# Patient Record
Sex: MALE | Race: BLACK OR AFRICAN AMERICAN | Employment: UNEMPLOYED | URBAN - METROPOLITAN AREA
[De-identification: names, ages, dates, MRNs, and addresses within clinical notes are randomized per-mention and may not be internally consistent; named-entity substitution may affect disease eponyms.]

---

## 2021-12-21 ENCOUNTER — HOSPITAL ENCOUNTER (EMERGENCY)
Age: 46
Discharge: HOME | End: 2021-12-21
Attending: EMERGENCY MEDICINE
Payer: COMMERCIAL

## 2021-12-21 VITALS
SYSTOLIC BLOOD PRESSURE: 151 MMHG | RESPIRATION RATE: 16 BRPM | TEMPERATURE: 98.7 F | DIASTOLIC BLOOD PRESSURE: 64 MMHG | BODY MASS INDEX: 31.15 KG/M2 | HEIGHT: 69 IN | OXYGEN SATURATION: 94 % | WEIGHT: 210.32 LBS | HEART RATE: 99 BPM

## 2021-12-21 DIAGNOSIS — Z78.9 ALCOHOL USE: ICD-10-CM

## 2021-12-21 DIAGNOSIS — R03.0 ELEVATED BLOOD PRESSURE READING: Primary | ICD-10-CM

## 2021-12-21 DIAGNOSIS — F19.10 SUBSTANCE ABUSE (HCC): ICD-10-CM

## 2021-12-21 PROCEDURE — 99283 EMERGENCY DEPT VISIT LOW MDM: CPT

## 2021-12-21 PROCEDURE — 99282 EMERGENCY DEPT VISIT SF MDM: CPT

## 2021-12-21 NOTE — ED PROVIDER NOTES
EMERGENCY DEPARTMENT HISTORY AND PHYSICAL EXAM      Date: 12/21/2021  Patient Name: Kristian Shabazz    History of Presenting Illness     Chief Complaint   Patient presents with    Drug Problem       History Provided By: Patient    Additional History (Context): Kristian Shabazz is a 51 y.o. male with No significant past medical history who presents with drug problem. Arrives via EMS after bystander saw patient sleeping on the porch. He endorses drinking a bottle of tequila overnight. In addition he reports having cocaine but unsure of what time. States this may have contributed to his drowsiness. Patient states he only came to the emergency room because the EMS worker states his blood pressure was elevated. Denies history of hypertension. No reports of chest pain, shortness of breath, nausea, vomiting. Patient states he does not have an established PCP. Per EMS, no Narcan was given on scene. PCP: None        Past History     Past Medical History:  History reviewed. No pertinent past medical history. Past Surgical History:  History reviewed. No pertinent surgical history. Family History:  History reviewed. No pertinent family history. Social History:  Social History     Tobacco Use    Smoking status: Current Every Day Smoker    Smokeless tobacco: Never Used   Substance Use Topics    Alcohol use: Yes     Comment: occ    Drug use: Yes     Types: Cocaine, Heroin       Allergies:  No Known Allergies      Review of Systems   Review of Systems   Constitutional: Negative for chills and fever. HENT: Negative for congestion, rhinorrhea and sore throat. Respiratory: Negative for cough and shortness of breath. Cardiovascular: Negative for chest pain and leg swelling. Gastrointestinal: Negative for abdominal pain, constipation, diarrhea, nausea and vomiting. Genitourinary: Negative for dysuria, frequency and urgency. Musculoskeletal: Negative for back pain, gait problem and neck pain.    Skin: Negative for wound. Neurological: Negative for dizziness, syncope, weakness, numbness and headaches. All other systems reviewed and are negative. Physical Exam     Vitals:    12/21/21 1521 12/21/21 1642   BP: (!) 140/96 (!) 151/64   Pulse: 99    Resp: 16    Temp: 98.7 °F (37.1 °C)    SpO2: 94%    Weight: 95.4 kg (210 lb 5.1 oz)    Height: 5' 9\" (1.753 m)      Physical Exam  Vitals and nursing note reviewed. Constitutional:       General: He is not in acute distress. Appearance: He is well-developed. He is not ill-appearing. HENT:      Head: Normocephalic and atraumatic. Right Ear: Tympanic membrane, ear canal and external ear normal.      Left Ear: Tympanic membrane, ear canal and external ear normal.      Nose: Nose normal.      Mouth/Throat:      Mouth: Mucous membranes are moist.      Pharynx: Oropharynx is clear. No oropharyngeal exudate or posterior oropharyngeal erythema. Eyes:      Extraocular Movements: Extraocular movements intact. Conjunctiva/sclera: Conjunctivae normal.      Pupils: Pupils are equal, round, and reactive to light. Cardiovascular:      Rate and Rhythm: Normal rate and regular rhythm. Pulses: Normal pulses. Heart sounds: Normal heart sounds. Pulmonary:      Effort: Pulmonary effort is normal.      Breath sounds: Normal breath sounds. Abdominal:      General: Bowel sounds are normal. There is no distension. Palpations: Abdomen is soft. Tenderness: There is no abdominal tenderness. There is no guarding or rebound. Musculoskeletal:         General: Normal range of motion. Cervical back: Normal range of motion and neck supple. Lymphadenopathy:      Cervical: No cervical adenopathy. Skin:     General: Skin is warm and dry. Neurological:      Mental Status: He is alert and oriented to person, place, and time. GCS: GCS eye subscore is 4. GCS verbal subscore is 5. GCS motor subscore is 6.       Cranial Nerves: Cranial nerves are intact. No cranial nerve deficit. Sensory: Sensation is intact. Motor: Motor function is intact. Coordination: Coordination is intact. Psychiatric:         Thought Content: Thought content normal.           Diagnostic Study Results     Labs -   No results found for this or any previous visit (from the past 12 hour(s)). Radiologic Studies -   No orders to display     CT Results  (Last 48 hours)    None        CXR Results  (Last 48 hours)    None            Medical Decision Making   I am the first provider for this patient. I reviewed the vital signs, available nursing notes, past medical history, past surgical history, family history and social history. Vital Signs-Reviewed the patient's vital signs. Records Reviewed: Nursing Notes, Old Medical Records, Previous Radiology Studies and Previous Laboratory Studies      57-year-old male presenting for drug problem exhibiting benign physical exam.  BP mildly elevated 140-150s over 60s. Discuss nonpharmacological interventions like diet, exercise. Advised to also refrain from substance abuse which has likely the cause of his elevated blood pressure today. Patient provided PCP resources for follow-up and verbalized understanding. HYPERTENSION COUNSELING  For 10 minutes, education was provided to the patient today regarding their hypertension. Patient is made aware of their elevated blood pressure and is instructed to follow up with their PCP. Patient is counseled regarding consequences of chronic, uncontrolled hypertension including kidney disease, heart disease, stroke or even death. Patient states their understanding and agrees to follow up this week. I reviewed heart healthy diet in addition to exercise for blood pressure management. The patient verbalized understanding  ED Course:          Disposition:  Discharge   DISCHARGE NOTE:   Pt has been reexamined. Patient has no new complaints, changes, or physical findings.   Care plan outlined and precautions discussed. All of pt's questions and concerns were addressed. Patient was instructed and agrees to follow up with PCP, as well as to return to the ED upon further deterioration. Patient is ready to go home. Follow-up Information     Follow up With Specialties Details Why David Edwards, [id number] [place] [id number]  [phone]    [facility]    [address] [facility] [address]          There are no discharge medications for this patient. Provider Notes (Medical Decision Making):     Procedures:  Procedures      Diagnosis     Clinical Impression:   1. Elevated blood pressure reading    2. Substance abuse ([facility] [address]    3.  Alcohol use

## 2021-12-21 NOTE — ED NOTES
Discharge instructions were given to the patient by Lizbet Whittington. The patient left the Emergency Department ambulatory, alert and oriented and in no acute distress with no prescriptions. The patient was encouraged to call or return to the ED for worsening issues or problems and was encouraged to schedule a follow up appointment for continuing care. The patient verbalized understanding of discharge instructions and prescriptions, all questions were answered. The patient has no further concerns at this time.

## 2021-12-21 NOTE — ED NOTES
Pt presents to ED stating someone called 911 because he was sleeping on his porch, he states he came to ER because EMS told him his blood pressure was really high. pt does admit to cocaine and heroin use this am denies cp/sob at this time. Pt is alert and oriented x 4, RR even and unlabored, skin is warm and dry. Assessment completed and pt updated on plan of care. Call bell in reach. Emergency Department Nursing Plan of Care       The Nursing Plan of Care is developed from the Nursing assessment and Emergency Department Attending provider initial evaluation. The plan of care may be reviewed in the ED Provider note.     The Plan of Care was developed with the following considerations:   Patient / Family readiness to learn indicated by:verbalized understanding  Persons(s) to be included in education: patient  Barriers to Learning/Limitations:No    Signed     María Elena Restrepo RN    12/21/2021   4:46 PM

## 2021-12-21 NOTE — ED TRIAGE NOTES
Pt reports doing cocaine, heroin, and drinking alcohol today. Pt reports EMS found him passed out on a porch. Pt reports he is here because they told him his BP was high.

## 2021-12-21 NOTE — DISCHARGE INSTRUCTIONS
It was a pleasure taking care of you at [facility] Emergency Department today. We know that when you come to [facility], you are entrusting us with your health, comfort, and safety. Our physicians and nurses honor that trust, and we truly appreciate the opportunity to care for you and your loved ones. We also value our feedback. If you receive a survey about your Emergency Department experience today, please fill it out. We care about our patients' feedback, and we listen to what you have to say. Thank you!

## 2023-01-29 ENCOUNTER — APPOINTMENT (OUTPATIENT)
Dept: CT IMAGING | Age: 48
End: 2023-01-29
Attending: PHYSICIAN ASSISTANT
Payer: COMMERCIAL

## 2023-01-29 ENCOUNTER — APPOINTMENT (OUTPATIENT)
Dept: GENERAL RADIOLOGY | Age: 48
End: 2023-01-29
Attending: PHYSICIAN ASSISTANT
Payer: COMMERCIAL

## 2023-01-29 ENCOUNTER — HOSPITAL ENCOUNTER (EMERGENCY)
Age: 48
Discharge: HOME | End: 2023-01-29
Attending: EMERGENCY MEDICINE
Payer: COMMERCIAL

## 2023-01-29 VITALS
BODY MASS INDEX: 27.55 KG/M2 | DIASTOLIC BLOOD PRESSURE: 83 MMHG | OXYGEN SATURATION: 94 % | HEIGHT: 69 IN | WEIGHT: 186 LBS | SYSTOLIC BLOOD PRESSURE: 117 MMHG | HEART RATE: 64 BPM | TEMPERATURE: 98.2 F | RESPIRATION RATE: 16 BRPM

## 2023-01-29 DIAGNOSIS — F19.10 SUBSTANCE ABUSE (HCC): ICD-10-CM

## 2023-01-29 DIAGNOSIS — K02.9 DENTAL CARIES: ICD-10-CM

## 2023-01-29 DIAGNOSIS — Z72.0 TOBACCO ABUSE: ICD-10-CM

## 2023-01-29 DIAGNOSIS — R55 VASOVAGAL SYNCOPE: Primary | ICD-10-CM

## 2023-01-29 LAB
ALBUMIN SERPL-MCNC: 3.1 G/DL (ref 3.5–5)
ALBUMIN/GLOB SERPL: 1.1 (ref 1.1–2.2)
ALP SERPL-CCNC: 59 U/L (ref 45–117)
ALT SERPL-CCNC: 17 U/L (ref 12–78)
AMPHET UR QL SCN: NEGATIVE
ANION GAP SERPL CALC-SCNC: 6 MMOL/L (ref 5–15)
AST SERPL-CCNC: 36 U/L (ref 15–37)
BARBITURATES UR QL SCN: NEGATIVE
BASOPHILS # BLD: 0 K/UL (ref 0–0.1)
BASOPHILS NFR BLD: 1 % (ref 0–1)
BENZODIAZ UR QL: NEGATIVE
BILIRUB SERPL-MCNC: 0.4 MG/DL (ref 0.2–1)
BUN SERPL-MCNC: 12 MG/DL (ref 6–20)
BUN/CREAT SERPL: 13 (ref 12–20)
CALCIUM SERPL-MCNC: 7.9 MG/DL (ref 8.5–10.1)
CANNABINOIDS UR QL SCN: NEGATIVE
CHLORIDE SERPL-SCNC: 106 MMOL/L (ref 97–108)
CO2 SERPL-SCNC: 29 MMOL/L (ref 21–32)
COCAINE UR QL SCN: POSITIVE
CREAT SERPL-MCNC: 0.95 MG/DL (ref 0.7–1.3)
DIFFERENTIAL METHOD BLD: ABNORMAL
DRUG SCRN COMMENT,DRGCM: ABNORMAL
EOSINOPHIL # BLD: 0.1 K/UL (ref 0–0.4)
EOSINOPHIL NFR BLD: 2 % (ref 0–7)
ERYTHROCYTE [DISTWIDTH] IN BLOOD BY AUTOMATED COUNT: 12.7 % (ref 11.5–14.5)
ETHANOL SERPL-MCNC: <10 MG/DL
GLOBULIN SER CALC-MCNC: 2.7 G/DL (ref 2–4)
GLUCOSE SERPL-MCNC: 173 MG/DL (ref 65–100)
HCT VFR BLD AUTO: 38.1 % (ref 36.6–50.3)
HGB BLD-MCNC: 13.4 G/DL (ref 12.1–17)
IMM GRANULOCYTES # BLD AUTO: 0 K/UL (ref 0–0.04)
IMM GRANULOCYTES NFR BLD AUTO: 0 % (ref 0–0.5)
LYMPHOCYTES # BLD: 1.4 K/UL (ref 0.8–3.5)
LYMPHOCYTES NFR BLD: 28 % (ref 12–49)
MCH RBC QN AUTO: 33.3 PG (ref 26–34)
MCHC RBC AUTO-ENTMCNC: 35.2 G/DL (ref 30–36.5)
MCV RBC AUTO: 94.8 FL (ref 80–99)
METHADONE UR QL: NEGATIVE
MONOCYTES # BLD: 0.4 K/UL (ref 0–1)
MONOCYTES NFR BLD: 8 % (ref 5–13)
NEUTS SEG # BLD: 3 K/UL (ref 1.8–8)
NEUTS SEG NFR BLD: 61 % (ref 32–75)
NRBC # BLD: 0 K/UL (ref 0–0.01)
NRBC BLD-RTO: 0 PER 100 WBC
OPIATES UR QL: NEGATIVE
PCP UR QL: NEGATIVE
PLATELET # BLD AUTO: 175 K/UL (ref 150–400)
PMV BLD AUTO: 10.1 FL (ref 8.9–12.9)
POTASSIUM SERPL-SCNC: 3.6 MMOL/L (ref 3.5–5.1)
PROT SERPL-MCNC: 5.8 G/DL (ref 6.4–8.2)
RBC # BLD AUTO: 4.02 M/UL (ref 4.1–5.7)
SODIUM SERPL-SCNC: 141 MMOL/L (ref 136–145)
TROPONIN-HIGH SENSITIVITY: 13 NG/L (ref 0–76)
TROPONIN-HIGH SENSITIVITY: 16 NG/L (ref 0–76)
WBC # BLD AUTO: 4.9 K/UL (ref 4.1–11.1)

## 2023-01-29 PROCEDURE — 36415 COLL VENOUS BLD VENIPUNCTURE: CPT

## 2023-01-29 PROCEDURE — 71045 X-RAY EXAM CHEST 1 VIEW: CPT

## 2023-01-29 PROCEDURE — 85025 COMPLETE CBC W/AUTO DIFF WBC: CPT

## 2023-01-29 PROCEDURE — 84484 ASSAY OF TROPONIN QUANT: CPT

## 2023-01-29 PROCEDURE — 70450 CT HEAD/BRAIN W/O DYE: CPT

## 2023-01-29 PROCEDURE — 99285 EMERGENCY DEPT VISIT HI MDM: CPT

## 2023-01-29 PROCEDURE — 80053 COMPREHEN METABOLIC PANEL: CPT

## 2023-01-29 PROCEDURE — 82077 ASSAY SPEC XCP UR&BREATH IA: CPT

## 2023-01-29 PROCEDURE — 93005 ELECTROCARDIOGRAM TRACING: CPT

## 2023-01-29 PROCEDURE — 80307 DRUG TEST PRSMV CHEM ANLYZR: CPT

## 2023-01-29 NOTE — ED NOTES
Pt comes with complaint of dizziness. Pt states he was watching basketball states stressed out from game and significant other. Blacked out with no LOC. Pt was staring off into space and laughing inappropriately. Pt also reports drug use, pt SO reports drug use last night \"thinks something was in it that shouldn't have been in it. His behavior seems off\". Pt denies fevers, dizziness at this time. Pt A & Ox4.

## 2023-01-29 NOTE — ED PROVIDER NOTES
[facility] EMERGENCY DEPT  EMERGENCY DEPARTMENT ENCOUNTER       Pt Name: Eliot Garcia  MRN: [id number]  [place] 1975  Date of evaluation: 1/29/2023  Provider: Taye Mckeon PA-C   PCP: None  Note Started: 11:26 AM 1/29/23     CHIEF COMPLAINT       Chief Complaint   Patient presents with    Dizziness     Dizziness since last night blacked out no LOC        HISTORY OF PRESENT ILLNESS: 1 or more elements      History From: Patient  HPI Limitations : None     Eliot Garcia is a 49 y.o. male with medical history significant for tobacco and substance abuse who presents via private vehicle with complaints of acute moderate episode of syncopal episode yesterday. Patient states that he was sitting and watching football when he stood up to get some pain medicine for his tooth pain. States that he \"blacked out\" for an unknown period of time. He states he must of been only a couple of minutes or seconds as there was another female individual there that said it was not long. Patient also endorses that he did take Tylenol 3 and was using cocaine prior to the episode. He denied any precipitating factors including chest pain, shortness of breath, palpitations. No recent fever, chills, nausea, vomiting, cough, hemoptysis, seizure activity, numbness, tingling, focal weakness, redness, rash, wound, neck pain, abdominal pain, diarrhea, URI symptoms. He denies any IV drug abuse. No history of diabetes. Patient's female partner who is present in the room also endorses concern that the cocaine he was using might of had other medications or drugs mixed with it and he is concerned that this may have caused his symptoms. No modifying factors prior to arrival.  Patient is very poor historian and unclear on the timeline of events from yesterday evening. He does deny any alcohol use. He is unclear whether he hit his head or not. No blood thinners.      Nursing Notes were all reviewed and agreed with or any disagreements were addressed in the HPI. REVIEW OF SYSTEMS      Review of Systems   Constitutional:  Negative for activity change, appetite change, chills, diaphoresis, fatigue and fever. HENT:  Positive for dental problem. Negative for congestion, drooling, ear discharge, ear pain, facial swelling, hearing loss, mouth sores, nosebleeds, postnasal drip, rhinorrhea, sinus pressure, sinus pain, sneezing, sore throat, tinnitus, trouble swallowing and voice change. Eyes:  Negative for photophobia, pain, redness and visual disturbance. Respiratory:  Negative for cough, chest tightness, shortness of breath and wheezing. Cardiovascular:  Negative for chest pain and palpitations. Gastrointestinal:  Negative for abdominal pain, constipation, diarrhea, nausea and vomiting. Genitourinary: Negative. Negative for dysuria and frequency. Musculoskeletal: Negative. Negative for arthralgias, back pain, gait problem, joint swelling, myalgias, neck pain and neck stiffness. Skin: Negative. Negative for pallor and rash. Neurological:  Positive for dizziness, syncope and light-headedness. Negative for tremors, seizures, facial asymmetry, speech difficulty, weakness, numbness and headaches. Psychiatric/Behavioral:  Negative for confusion. The patient is nervous/anxious. Positives and Pertinent negatives as per HPI. PAST HISTORY     Past Medical History:  History reviewed. No pertinent past medical history. Past Surgical History:  History reviewed. No pertinent surgical history. Family History:  History reviewed. No pertinent family history.     Social History:  Social History     Tobacco Use    Smoking status: Every Day    Smokeless tobacco: Never   Substance Use Topics    Alcohol use: Yes     Comment: occ    Drug use: Yes     Types: Cocaine, Heroin       Allergies:  No Known Allergies    CURRENT MEDICATIONS      Previous Medications    No medications on file       SCREENINGS               No data recorded PHYSICAL EXAM      ED Triage Vitals [01/29/23 1124]   ED Encounter Vitals Group      BP (!) 136/103      Pulse (Heart Rate) 89      Resp Rate 17      Temp 98.2 °F (36.8 °C)      Temp src       O2 Sat (%) 92 %      Weight 186 lb      Height 5' 9\"        Physical Exam  Vitals and nursing note reviewed. Constitutional:       General: He is not in acute distress. Appearance: Normal appearance. He is well-developed. He is not ill-appearing, toxic-appearing or diaphoretic. HENT:      Head: Normocephalic and atraumatic. Jaw: There is normal jaw occlusion. No trismus, tenderness, swelling or malocclusion. Right Ear: Hearing, tympanic membrane, ear canal and external ear normal.      Left Ear: Hearing, tympanic membrane, ear canal and external ear normal.      Nose: Nose normal.      Right Sinus: No maxillary sinus tenderness or frontal sinus tenderness. Left Sinus: No maxillary sinus tenderness or frontal sinus tenderness. Mouth/Throat:      Mouth: Mucous membranes are moist. No angioedema. Dentition: Does not have dentures. Dental caries present. No dental tenderness, gingival swelling or dental abscesses. Tongue: No lesions. Tongue does not deviate from midline. Palate: No mass and lesions. Pharynx: Oropharynx is clear. Uvula midline. No pharyngeal swelling, oropharyngeal exudate, posterior oropharyngeal erythema or uvula swelling. Tonsils: No tonsillar exudate or tonsillar abscesses. Eyes:      Extraocular Movements: Extraocular movements intact. Conjunctiva/sclera: Conjunctivae normal.   Neck:      Trachea: Trachea and phonation normal.   Cardiovascular:      Rate and Rhythm: Normal rate and regular rhythm. Pulses: Normal pulses. Radial pulses are 2+ on the right side and 2+ on the left side. Posterior tibial pulses are 2+ on the right side and 2+ on the left side.       Heart sounds: Normal heart sounds, S1 normal and S2 normal. Pulmonary:      Effort: Pulmonary effort is normal. No tachypnea, accessory muscle usage or respiratory distress. Breath sounds: Normal breath sounds and air entry. No stridor. No decreased breath sounds, wheezing, rhonchi or rales. Abdominal:      General: Abdomen is flat. Palpations: Abdomen is soft. Tenderness: There is no abdominal tenderness. There is no guarding. Musculoskeletal:         General: Normal range of motion. Cervical back: Full passive range of motion without pain and normal range of motion. Right lower leg: No edema. Left lower leg: No edema. Lymphadenopathy:      Cervical: No cervical adenopathy. Skin:     General: Skin is warm and dry. Capillary Refill: Capillary refill takes less than 2 seconds. Coloration: Skin is not pale. Neurological:      General: No focal deficit present. Mental Status: He is alert and oriented to person, place, and time. Cranial Nerves: Cranial nerves 2-12 are intact. Sensory: Sensation is intact. Motor: Motor function is intact. Coordination: Coordination is intact. Gait: Gait is intact. Psychiatric:         Speech: Speech normal.         Behavior: Behavior normal.         Thought Content:  Thought content normal.         Judgment: Judgment normal.       Pulse Oximetry Analysis - Normal 97% on RA       DIAGNOSTIC RESULTS   LABS:     Recent Results (from the past 12 hour(s))   TROPONIN-HIGH SENSITIVITY    Collection Time: 01/29/23 11:30 AM   Result Value Ref Range    Troponin-High Sensitivity 13 0 - 76 ng/L   CBC WITH AUTOMATED DIFF    Collection Time: 01/29/23 11:30 AM   Result Value Ref Range    WBC 4.9 4.1 - 11.1 K/uL    RBC 4.02 (L) 4.10 - 5.70 M/uL    HGB 13.4 12.1 - 17.0 g/dL    HCT 38.1 36.6 - 50.3 %    MCV 94.8 80.0 - 99.0 FL    MCH 33.3 26.0 - 34.0 PG    MCHC 35.2 30.0 - 36.5 g/dL    RDW 12.7 11.5 - 14.5 %    PLATELET 237 594 - 788 K/uL    MPV 10.1 8.9 - 12.9 FL    NRBC 0.0 0 PER 100 WBC    ABSOLUTE NRBC 0.00 0.00 - 0.01 K/uL    NEUTROPHILS 61 32 - 75 %    LYMPHOCYTES 28 12 - 49 %    MONOCYTES 8 5 - 13 %    EOSINOPHILS 2 0 - 7 %    BASOPHILS 1 0 - 1 %    IMMATURE GRANULOCYTES 0 0.0 - 0.5 %    ABS. NEUTROPHILS 3.0 1.8 - 8.0 K/UL    ABS. LYMPHOCYTES 1.4 0.8 - 3.5 K/UL    ABS. MONOCYTES 0.4 0.0 - 1.0 K/UL    ABS. EOSINOPHILS 0.1 0.0 - 0.4 K/UL    ABS. BASOPHILS 0.0 0.0 - 0.1 K/UL    ABS. IMM. GRANS. 0.0 0.00 - 0.04 K/UL    DF AUTOMATED     METABOLIC PANEL, COMPREHENSIVE    Collection Time: 01/29/23 11:30 AM   Result Value Ref Range    Sodium 141 136 - 145 mmol/L    Potassium 3.6 3.5 - 5.1 mmol/L    Chloride 106 97 - 108 mmol/L    CO2 29 21 - 32 mmol/L    Anion gap 6 5 - 15 mmol/L    Glucose 173 (H) 65 - 100 mg/dL    BUN 12 6 - 20 MG/DL    Creatinine 0.95 0.70 - 1.30 MG/DL    BUN/Creatinine ratio 13 12 - 20      eGFR >60 >60 ml/min/1.73m2    Calcium 7.9 (L) 8.5 - 10.1 MG/DL    Bilirubin, total 0.4 0.2 - 1.0 MG/DL    ALT (SGPT) 17 12 - 78 U/L    AST (SGOT) 36 15 - 37 U/L    Alk. phosphatase 59 45 - 117 U/L    Protein, total 5.8 (L) 6.4 - 8.2 g/dL    Albumin 3.1 (L) 3.5 - 5.0 g/dL    Globulin 2.7 2.0 - 4.0 g/dL    A-G Ratio 1.1 1.1 - 2.2     ETHYL ALCOHOL    Collection Time: 01/29/23 11:30 AM   Result Value Ref Range    ALCOHOL(ETHYL),SERUM <10 <10 MG/DL   DRUG SCREEN, URINE    Collection Time: 01/29/23 12:14 PM   Result Value Ref Range    AMPHETAMINES Negative NEG      BARBITURATES Negative NEG      BENZODIAZEPINES Negative NEG      COCAINE Positive (A) NEG      METHADONE Negative NEG      OPIATES Negative NEG      PCP(PHENCYCLIDINE) Negative NEG      THC (TH-CANNABINOL) Negative NEG      Drug screen comment (NOTE)    TROPONIN-HIGH SENSITIVITY    Collection Time: 01/29/23 12:55 PM   Result Value Ref Range    Troponin-High Sensitivity 16 0 - 76 ng/L        EKG:  When ordered, EKG's are interpreted by the Emergency Department Physician in the absence of a cardiologist.  Please see their note for interpretation of EKG. RADIOLOGY:  Non-plain film images such as CT, Ultrasound and MRI are read by the radiologist. Plain radiographic images are visualized and preliminarily interpreted by the ED Provider with the below findings:       Interpretation per the Radiologist below, if available at the time of this note:     CT HEAD WO CONT    Result Date: 1/29/2023  EXAM: CT HEAD WO CONT INDICATION: syncope; head injury? COMPARISON: CT February 2009. CONTRAST: None. TECHNIQUE: Unenhanced CT of the head was performed using 5 mm images. Brain and bone windows were generated. Coronal and sagittal reformats. CT dose reduction was achieved through use of a standardized protocol tailored for this examination and automatic exposure control for dose modulation. FINDINGS: The ventricles and sulci are normal in size, shape and configuration. There is no significant white matter disease. There is no intracranial hemorrhage, extra-axial collection, or mass effect. The basilar cisterns are open. No CT evidence of acute infarct. The bone windows demonstrate surgical hardware in the lateral left orbital wall. The visualized portions of the paranasal sinuses and mastoid air cells are clear. No acute intracranial abnormality. XR CHEST PORT    Result Date: 1/29/2023  EXAM:  XR CHEST PORT INDICATION: Syncope COMPARISON: none TECHNIQUE: portable chest AP view FINDINGS: The cardiac silhouette is within normal limits. The pulmonary vasculature is within normal limits. The lungs and pleural spaces are clear. The visualized bones and upper abdomen are age-appropriate. No acute process on portable chest.        PROCEDURES   Unless otherwise noted below, none  Procedures     CRITICAL CARE TIME   none    EMERGENCY DEPARTMENT COURSE and DIFFERENTIAL DIAGNOSIS/MDM   Initial assessment performed. The patients presenting problems have been discussed, and they are in agreement with the care plan formulated and outlined with them.   I have encouraged them to ask questions as they arise throughout their visit. Vitals:    Vitals:    01/29/23 1124 01/29/23 1126 01/29/23 1130 01/29/23 1206   BP: (!) 136/103  123/82 117/83   Pulse: 89   64   Resp: 17      Temp: 98.2 °F (36.8 °C)      SpO2: 92% 97% 96% 94%   Weight: 84.4 kg (186 lb)      Height: 5' 9\" (1.753 m)           Patient was given the following medications:  Medications - No data to display    CONSULTS: (Who and What was discussed)  None      Chronic Conditions: substance and tobacco abuse    Social Determinants affecting Dx or Tx: None    Records Reviewed (source and summary): Prior medical records, Previous Radiology studies, Previous Laboratory studies, Previous EKGs, and Nursing notes    CC/HPI Summary, DDx, ED Course, and Reassessment: Well-appearing but anxious 43-year-old male presents secondary to syncopal episode yesterday. States that he was standing up to get some pain medications for his tooth pain when he also did \"blacked out\" and lost consciousness for unknown period of time. No known head injury, the patient is a very poor historian and is unclear of the timeline. He also endorses using cocaine and is unclear whether cocaine and other drugs mixed with it. denies IV drug abuse. Patient has relatively unremarkable exam stable heart and lung sounds and intact distal pulses. Ddx: vasovagal/situational syncope, cardiogenic syncope, orthostatic hypotension, dehydration. Will get labs, EKG, orthostatics and fluids PRN. We will also obtain CT head given patient's poor historian unclear head injury or trauma.     Per the [facility] Syncope Rule, the patient does not have the following criteria:  1) Signs and symptoms of ACS  2) Signs of conduction disease  3) Worrisome cardiac history  4) Valvular heart disease by history or physical  examination  5) Family history of sudden death  6) Persistent abnormal vital signs in the ED  7) Volume depletion such as persistent dehydration, gastrointestinal bleeding, or hematocrit < 30  8) Primary CNS (central nervous system) event    The patient does not have any of the following risk factors for the [facility] Syncope Rule (SFSR):    C - History of congestive heart failure   H - Hematocrit < 30%   E - Abnormal ECG   S - Shortness of breath   S - Triage systolic blood pressure < 90      ED Course as of 01/29/23 1342   Sun Jan 29, 2023   1251 Pt resting comfortably in room in NAD. No new symptoms or complaints at this time. Available labs/ results reviewed with pt. Plan to trend troponin prior to discharge. Pt currently asymptomatic. [SM]      ED Course User Index  [SM] Hubbard Homans, PA-C     TOBACCO COUNSELING:  Upon evaluation, pt expressed that they are a current tobacco user. Pt has been counseled on the dangers of smoking and was encouraged to quit as soon as possible in order to decrease further risks to their health. Pt has conveyed their understanding of the risks involved should they continue to use tobacco products. 4-minute discussion    Disposition Considerations (Tests not done, Shared Decision Making, Pt Expectation of Test or Tx.):      Progress Note:   Updated pt on all returned results and findings. Discussed the importance of proper follow up as referred below along with return precautions. Pt in agreement with the care plan and expresses agreement with and understanding of all items discussed. FINAL IMPRESSION     1. Vasovagal syncope    2. Substance abuse ([address]    3. Tobacco abuse    4. Dental caries          DISPOSITION/PLAN   Eleonora East's  results have been reviewed with him. He has been counseled regarding his diagnosis, treatment, and plan. He verbally conveys understanding and agreement of the signs, symptoms, diagnosis, treatment and prognosis and additionally agrees to follow up as discussed. He also agrees with the care-plan and conveys that all of his questions have been answered.   I have also provided discharge instructions for him that include: educational information regarding their diagnosis and treatment, and list of reasons why they would want to return to the ED prior to their follow-up appointment, should his condition change. Discharged    1:42 PM  I have discussed with patient their diagnosis, treatment, and follow up plan. The patient agrees to follow up as outlined in discharge paperwork and also to return to the ED with any worsening. Kezia Contreras PA-C         PATIENT REFERRED TO:  Follow-up Information       Follow up With Specialties Details Why [address] [facility] EMERGENCY DEPT Emergency Medicine Go to  As needed, If symptoms worsen [address] [facility]  Schedule an appointment as soon as possible for a visit in 1 day As needed [id number] [facility] [address]  There are no discharge medications for this patient. DISCONTINUED MEDICATIONS:  There are no discharge medications for this patient. Shared Not Shared DARRIAN: I have seen and evaluated the patient. My supervision physician was available for consultation. I am the Primary Clinician of Record. Kezia Contreras PA-C (electronically signed)    (Please note that parts of this dictation were completed with voice recognition software. Quite often unanticipated grammatical, syntax, homophones, and other interpretive errors are inadvertently transcribed by the computer software. Please disregards these errors.  Please excuse any errors that have escaped final proofreading.)

## 2023-01-30 LAB
ATRIAL RATE: 67 BPM
CALCULATED P AXIS, ECG09: 72 DEGREES
CALCULATED R AXIS, ECG10: -16 DEGREES
CALCULATED T AXIS, ECG11: 41 DEGREES
DIAGNOSIS, 93000: NORMAL
P-R INTERVAL, ECG05: 164 MS
Q-T INTERVAL, ECG07: 386 MS
QRS DURATION, ECG06: 114 MS
QTC CALCULATION (BEZET), ECG08: 407 MS
VENTRICULAR RATE, ECG03: 67 BPM

## 2024-04-28 ENCOUNTER — HOSPITAL ENCOUNTER (EMERGENCY)
Facility: HOSPITAL | Age: 49
Discharge: HOME | End: 2024-04-28
Attending: EMERGENCY MEDICINE
Payer: MEDICAID

## 2024-04-28 VITALS
HEIGHT: 69 IN | BODY MASS INDEX: 26.66 KG/M2 | WEIGHT: 180 LBS | SYSTOLIC BLOOD PRESSURE: 134 MMHG | RESPIRATION RATE: 17 BRPM | TEMPERATURE: 97.8 F | DIASTOLIC BLOOD PRESSURE: 92 MMHG | OXYGEN SATURATION: 97 % | HEART RATE: 89 BPM

## 2024-04-28 DIAGNOSIS — M54.12 CERVICAL RADICULOPATHY: Primary | ICD-10-CM

## 2024-04-28 DIAGNOSIS — G56.22 ULNAR NEUROPATHY OF LEFT UPPER EXTREMITY: ICD-10-CM

## 2024-04-28 DIAGNOSIS — M79.602 PAIN OF LEFT UPPER EXTREMITY: ICD-10-CM

## 2024-04-28 DIAGNOSIS — Z72.0 TOBACCO ABUSE: ICD-10-CM

## 2024-04-28 PROCEDURE — 6360000002 HC RX W HCPCS: Performed by: EMERGENCY MEDICINE

## 2024-04-28 PROCEDURE — 96372 THER/PROPH/DIAG INJ SC/IM: CPT

## 2024-04-28 PROCEDURE — 93005 ELECTROCARDIOGRAM TRACING: CPT | Performed by: EMERGENCY MEDICINE

## 2024-04-28 PROCEDURE — 99284 EMERGENCY DEPT VISIT MOD MDM: CPT

## 2024-04-28 PROCEDURE — 6370000000 HC RX 637 (ALT 250 FOR IP): Performed by: EMERGENCY MEDICINE

## 2024-04-28 RX ORDER — GABAPENTIN 100 MG/1
100 CAPSULE ORAL 3 TIMES DAILY
Qty: 15 CAPSULE | Refills: 0 | Status: SHIPPED | OUTPATIENT
Start: 2024-04-28 | End: 2024-10-25

## 2024-04-28 RX ORDER — METHOCARBAMOL 750 MG/1
750 TABLET, FILM COATED ORAL 3 TIMES DAILY
Qty: 15 TABLET | Refills: 0 | Status: SHIPPED | OUTPATIENT
Start: 2024-04-28 | End: 2024-05-08

## 2024-04-28 RX ORDER — GABAPENTIN 300 MG/1
300 CAPSULE ORAL ONCE
Status: COMPLETED | OUTPATIENT
Start: 2024-04-28 | End: 2024-04-28

## 2024-04-28 RX ORDER — PREDNISONE 5 MG/1
TABLET ORAL
Qty: 21 EACH | Refills: 0 | Status: SHIPPED | OUTPATIENT
Start: 2024-04-28

## 2024-04-28 RX ORDER — KETOROLAC TROMETHAMINE 30 MG/ML
30 INJECTION, SOLUTION INTRAMUSCULAR; INTRAVENOUS ONCE
Status: COMPLETED | OUTPATIENT
Start: 2024-04-28 | End: 2024-04-28

## 2024-04-28 RX ADMIN — KETOROLAC TROMETHAMINE 30 MG: 30 INJECTION, SOLUTION INTRAMUSCULAR; INTRAVENOUS at 19:52

## 2024-04-28 RX ADMIN — GABAPENTIN 300 MG: 300 CAPSULE ORAL at 19:52

## 2024-04-28 ASSESSMENT — PAIN DESCRIPTION - PAIN TYPE: TYPE: ACUTE PAIN

## 2024-04-28 ASSESSMENT — PAIN DESCRIPTION - LOCATION
LOCATION: ARM
LOCATION: ARM

## 2024-04-28 ASSESSMENT — PAIN DESCRIPTION - ORIENTATION
ORIENTATION: LEFT
ORIENTATION: LEFT

## 2024-04-28 ASSESSMENT — PAIN SCALES - GENERAL
PAINLEVEL_OUTOF10: 10
PAINLEVEL_OUTOF10: 10

## 2024-04-28 ASSESSMENT — PAIN DESCRIPTION - DESCRIPTORS
DESCRIPTORS: SHARP
DESCRIPTORS: TINGLING

## 2024-04-28 ASSESSMENT — PAIN - FUNCTIONAL ASSESSMENT: PAIN_FUNCTIONAL_ASSESSMENT: 0-10

## 2024-04-28 NOTE — ED NOTES
Pt presents to ED ambulatory complaining of L arm and finger pain tingling x1 month. Pt is alert and oriented x 4, RR even and unlabored, skin  intact. Assessment completed and pt updated on plan of care.  Call bell in reach.       Emergency Department Nursing Plan of Care       The Nursing Plan of Care is developed from the Nursing assessment and Emergency Department Attending provider initial evaluation.  The plan of care may be reviewed in the “ED Provider note”.    The Plan of Care was developed with the following considerations:   Patient / Family readiness to learn indicated by:verbalized understanding  Persons(s) to be included in education: patient  Barriers to Learning/Limitations:No    Signed

## 2024-04-29 LAB
EKG ATRIAL RATE: 74 BPM
EKG DIAGNOSIS: NORMAL
EKG P AXIS: 73 DEGREES
EKG P-R INTERVAL: 146 MS
EKG Q-T INTERVAL: 390 MS
EKG QRS DURATION: 84 MS
EKG QTC CALCULATION (BAZETT): 432 MS
EKG R AXIS: -26 DEGREES
EKG T AXIS: 46 DEGREES
EKG VENTRICULAR RATE: 74 BPM

## 2024-04-29 PROCEDURE — 93010 ELECTROCARDIOGRAM REPORT: CPT | Performed by: INTERNAL MEDICINE

## 2024-04-29 NOTE — ED NOTES
Discharge instructions were given to the patient by Zak.     The patient left the Emergency Department alert and oriented and in no acute distress with 3 prescriptions. The patient was encouraged to call or return to the ED for worsening issues or problems and was encouraged to schedule a follow up appointment for continuing care.     Ambulation assessment completed before discharge.  Pt left Emergency Department ambulating at baseline with no ortho devices  Ortho device education: none    The patient verbalized understanding of discharge instructions and prescriptions, all questions were answered. The patient has no further concerns at this time.

## 2024-04-29 NOTE — ED PROVIDER NOTES
EMERGENCY DEPARTMENT HISTORY AND PHYSICAL EXAM            Please note that this dictation was completed with the assistance of \"Dragon\", the computer voice recognition software. Quite often unanticipated grammatical, syntax, homophones, and other interpretive errors are inadvertently transcribed by the computer software. Please disregard these errors and any errors that have escaped final proofreading. Thank you.    Date of Evaluation: 04/28/24  Patient: Sagar Joseph  Patient Age and Sex: 49 y.o. male   MRN: [id number]  CSN: [id number]  PCP: No primary care provider on file.    History of Present Illness     Chief Complaint   Patient presents with    Arm Pain     Per pt reports left arm pain x 2 weeks, +tingling/numbness sensation, no obvious deformity noted.      History Provided By: Patient/family/EMS (if available)    History is limited by: Nothing     HPI: Sagar Joseph, 49 y.o. male with past medical history as documented below presents to the ED with c/o of 1 month history of mild to moderate left upper arm pain and tingling sensation along the left fingers.  Patient reports no falls or trauma.  Denies any head injury.  No blurred or double vision.  No slurred speech.  Denies any history of neck problems.  States pain is intermittent and is sharp in nature.  Patient is right-hand dominant.  Patient is taking no medications here for symptoms. Pt denies any other exacerbating or ameliorating factors. There are no other complaints, changes or physical findings pertinent to the HPI at this time.    Nursing notes were all reviewed and agreed with or any disagreements were addressed in the HPI.    Past History   Past Medical History:  No past medical history on file.    Past Surgical History:  No past surgical history on file.    Family History:   Family history reviewed and was non-contributory, unless specified below:  No family history on file.    Social History:  Social History     Tobacco Use    Smoking